# Patient Record
Sex: FEMALE | Race: AMERICAN INDIAN OR ALASKA NATIVE | Employment: STUDENT | URBAN - METROPOLITAN AREA
[De-identification: names, ages, dates, MRNs, and addresses within clinical notes are randomized per-mention and may not be internally consistent; named-entity substitution may affect disease eponyms.]

---

## 2019-11-07 ENCOUNTER — TELEPHONE (OUTPATIENT)
Dept: FAMILY MEDICINE | Facility: CLINIC | Age: 24
End: 2019-11-07

## 2019-11-07 NOTE — TELEPHONE ENCOUNTER
Called and spoke with patient and informed provider was not going to be in office and needing to reschedule. Offered other appointments and patient declined and informed [place]. Cancelled appointment. Bianca Dimas MA